# Patient Record
Sex: FEMALE | Race: WHITE | NOT HISPANIC OR LATINO
[De-identification: names, ages, dates, MRNs, and addresses within clinical notes are randomized per-mention and may not be internally consistent; named-entity substitution may affect disease eponyms.]

---

## 2019-03-20 ENCOUNTER — APPOINTMENT (OUTPATIENT)
Dept: ORTHOPEDIC SURGERY | Facility: CLINIC | Age: 59
End: 2019-03-20
Payer: COMMERCIAL

## 2019-03-20 VITALS
HEART RATE: 72 BPM | SYSTOLIC BLOOD PRESSURE: 130 MMHG | WEIGHT: 180 LBS | HEIGHT: 66 IN | DIASTOLIC BLOOD PRESSURE: 75 MMHG | BODY MASS INDEX: 28.93 KG/M2

## 2019-03-20 DIAGNOSIS — Z78.9 OTHER SPECIFIED HEALTH STATUS: ICD-10-CM

## 2019-03-20 DIAGNOSIS — F17.200 NICOTINE DEPENDENCE, UNSPECIFIED, UNCOMPLICATED: ICD-10-CM

## 2019-03-20 DIAGNOSIS — Z82.61 FAMILY HISTORY OF ARTHRITIS: ICD-10-CM

## 2019-03-20 DIAGNOSIS — S93.491A SPRAIN OF OTHER LIGAMENT OF RIGHT ANKLE, INITIAL ENCOUNTER: ICD-10-CM

## 2019-03-20 DIAGNOSIS — Z82.62 FAMILY HISTORY OF OSTEOPOROSIS: ICD-10-CM

## 2019-03-20 PROCEDURE — 73610 X-RAY EXAM OF ANKLE: CPT | Mod: RT

## 2019-03-20 PROCEDURE — 99203 OFFICE O/P NEW LOW 30 MIN: CPT

## 2019-03-20 RX ORDER — METOPROLOL TARTRATE 75 MG/1
TABLET, FILM COATED ORAL
Refills: 0 | Status: ACTIVE | COMMUNITY

## 2019-03-20 RX ORDER — LOSARTAN POTASSIUM AND HYDROCHLOROTHIAZIDE 100; 12.5 MG/1; MG/1
TABLET, FILM COATED ORAL
Refills: 0 | Status: ACTIVE | COMMUNITY

## 2019-03-20 NOTE — HISTORY OF PRESENT ILLNESS
[de-identified] : Patient is here for right ankle pain that began on 3/16/19 when she got up off of a couch and rolled her ankle. She states that she has been having pain and swelling since then. She says that the swelling has decreased since yesterday. She states that she occasionally has pain when she walks but has been able to ambulate. She has been using ice and elevating the area but doing nothing else to treat it at this point. Denies N/T/R/Prior injury.

## 2019-03-20 NOTE — DISCUSSION/SUMMARY
[de-identified] : Discussed findings of today's exam and possible causes of patient's pain.  Educated patient on their most probable diagnosis of right ankle sprain.  Reviewed possible courses of treatment, and we collaboratively decided best course of treatment at this time will include conservative management. Patient advised there is no evidence of fracture on x-ray. She may  over the counter ankle compression sleeve to be worn during the day for support.  Patient will be started on a course of physical therapy to restore normal range of motion and strength as tolerated.  Follow up as needed.  Patient appreciates and agrees with current plan.\par \par This note was generated using dragon medical dictation software.  A reasonable effort has been made for proofreading its contents, but typos may still remain.  If there are any questions or points of clarification needed please notify my office.

## 2019-03-20 NOTE — PHYSICAL EXAM
[de-identified] : Constitutional: Well-nourished, well-developed, No acute distress\par Respiratory:  Good respiratory effort, no SOB\par Lymphatic: No regional lymphadenopathy, no lymphedema\par Psychiatric: Pleasant and normal affect, alert and oriented x3\par Skin: Clean dry and intact B/L UE/LE\par Musculoskeletal: normal except where as noted in regional exam\par \par \par Left Ankle:\par APPEARANCE: no marked deformities, no swelling or malalignment\par POSITIVE TENDERNESS: none\par NONTENDER: medial malleolus, lateral malleolus, tibialis posterior tendon, achilles tendon, no marked thickening of tendon, ATFL, CFL, PTFL, anterior tibiofibular ligament (high ankle), sinus tarsus, deltoid ligaments, 5th metatarsal. \par RANGE OF MOTION: full & painless. \par RESISTIVE TESTING: painless resisted dorsiflexion, plantar flexion, inversion & eversion. \par SPECIAL TESTS: neg anterior drawer. neg talar tilt. neg Kleiger's\par NEURO: Normal sensation of LE, DTRs 2+/4 patella and achilles\par PULSES: 2+ DP/PT pulses\par B/L Hips: No asymmetry, malalignment, or swelling, Full ROM, 5/5 strength in flexion/ext, IR/ER, Abd/Add, Joints stable\par B/L Knees: No asymmetry, malalignment, or swelling, Full ROM, 5/5 strength in Flex/Ext, Joints stable\par \par Right Ankle:\par APPEARANCE: + Moderate swelling, no ecchymosis of the anterolateral ankle and lateral malleolus, no marked deformities  or malalignment\par POSITIVE TENDERNESS: ATFL, CFL, Lateral malleolus\par NONTENDER: medial malleolus, lateral malleolus, tibialis posterior tendon, achilles tendon, no marked thickening of tendon, PTFL, anterior tibiofibular ligament (high ankle), sinus tarsus, deltoid ligaments, 5th metatarsal. \par RANGE OF MOTION: Mild limitation of PF and Inversion due to pain/swelling, NL DF and Eversion. \par RESISTIVE TESTING: Mild pain with resisted eversion and DF.  painless resisted  plantar flexion, and inversion. \par SPECIAL TESTS: + anterior drawer for pain without laxity, + talar tilt for pain without laxity, neg Kleiger's\par PULSES: 2+ DP/PT pulses\par Neuro: NL sensation of ankle, foot and toes, Achilles 2+/4\par \par  [de-identified] : The following radiographs were ordered and read by me during this patient's visit. I reviewed each radiograph in detail with the patient and discussed the findings as highlighted below. \par \par 3 views of the right ankle were obtained today that show no fracture, or dislocation. There are no degenerative changes seen. There is no malalignment. No obvious osseous abnormality. Otherwise unremarkable.\par \par

## 2023-02-26 ENCOUNTER — EMERGENCY (EMERGENCY)
Facility: HOSPITAL | Age: 63
LOS: 1 days | Discharge: HOME | End: 2023-02-26
Attending: EMERGENCY MEDICINE | Admitting: EMERGENCY MEDICINE
Payer: COMMERCIAL

## 2023-02-26 VITALS
OXYGEN SATURATION: 98 % | RESPIRATION RATE: 16 BRPM | TEMPERATURE: 98 F | HEIGHT: 65 IN | DIASTOLIC BLOOD PRESSURE: 78 MMHG | HEART RATE: 64 BPM | WEIGHT: 179.9 LBS | SYSTOLIC BLOOD PRESSURE: 151 MMHG

## 2023-02-26 PROCEDURE — 99283 EMERGENCY DEPT VISIT LOW MDM: CPT

## 2023-02-26 PROCEDURE — 99284 EMERGENCY DEPT VISIT MOD MDM: CPT

## 2023-02-26 RX ORDER — ERYTHROMYCIN BASE 5 MG/GRAM
1 OINTMENT (GRAM) OPHTHALMIC (EYE)
Qty: 5 | Refills: 0
Start: 2023-02-26 | End: 2023-03-04

## 2023-02-26 NOTE — ED PROVIDER NOTE - CLINICAL SUMMARY MEDICAL DECISION MAKING FREE TEXT BOX
62-year-old female presents to the ED complaining of left eyelid redness and pain.  Patient states she had right eye infection that she was told by urgent care was secondary to a stye.  She was given Polytrim eyedrops and that is better.  However the left eye seems to be developing and getting worse.  No drainage of fluid.  Patient just notes that the left upper eyelid is more erythematous and painful than prior.  No fever or chills.  No pain with eye movement.  No visual changes.  Exam as stated. Plan for erythro eye ointment and po abx to treat preseptal cellulitis secondary to stye. Recc warm compresses and f/u with PCP.

## 2023-02-26 NOTE — ED PROVIDER NOTE - OBJECTIVE STATEMENT
62-year-old female presents to the ED complaining of left eyelid redness and pain.  Patient states she had right eye infection that she was told by urgent care was secondary to a stye.  She was given Polytrim eyedrops and that is better.  However the left eye seems to be developing and getting worse.  No drainage of fluid.  Patient just notes that the left upper eyelid is more erythematous and painful than prior.  No fever or chills.  No pain with eye movement.  No visual changes.

## 2023-02-26 NOTE — ED ADULT TRIAGE NOTE - CHIEF COMPLAINT QUOTE
I had conjunctivitis and went to urgent care they said I may have a stye, but it feels like its worse. ,

## 2023-02-26 NOTE — ED PROVIDER NOTE - PHYSICAL EXAMINATION
General:     NAD, well-nourished, well-appearing  Eyes: PERRL, white sclera, Left upper eyelid with erythema.  Stye is noted to the medial aspect of the eyelid.  No crusting or drainage noted.  There is no conjunctival injection.  Range of motion of the eye is normal and without exacerbation of tenderness.  Head:     NC/AT, EOMI

## 2023-02-26 NOTE — ED PROVIDER NOTE - PATIENT PORTAL LINK FT
You can access the FollowMyHealth Patient Portal offered by [facility] by registering at the following website: [email or web address]. By joining [facility]’s FollowMyHealth portal, you will also be able to view your health information using other applications (apps) compatible with our system.

## 2023-02-26 NOTE — ED PROVIDER NOTE - NSFOLLOWUPINSTRUCTIONS_ED_ALL_ED_FT
Preseptal Cellulitis, Adult      Preseptal cellulitis is an infection of the eyelid and the tissues around the eye (periorbital area). The infection causes painful swelling and redness. This condition may also be called periorbital cellulitis.    In most cases, the condition can be treated with antibiotic medicine at home. It is important to treat preseptal cellulitis right away so that it does not get worse. If it gets worse, it can spread to the eye socket and eye muscles (orbital cellulitis). Orbital cellulitis is a medical emergency.      What are the causes?    Preseptal cellulitis is most commonly caused by bacteria. In rare cases, it can be caused by a virus or fungus. The germs that cause preseptal cellulitis may come from:  •A sinus infection that spreads near the eyes.      •An injury near the eye, such as a scratch, puncture wound, animal bite, or insect bite.      •A skin rash, such as eczema or poison ivy, that becomes infected.      •An infected pimple on the eyelid (stye).      •Infection after eyelid surgery or injury.        What increases the risk?    You are more likely to develop this condition if:  •You have a weakened disease-fighting system (immune system).      •You have a medical condition that raises your risk for sinus infections, such as nasal polyps.        What are the signs or symptoms?     Symptoms of this condition include:  •Eyelids that are red and swollen and feel unusually hot.      •Fever.      •Difficulty opening the eye.      •Headache.      •Pain in the face.      Symptoms of this condition usually develop suddenly.      How is this diagnosed?    This condition may be diagnosed based on your symptoms, your medical history, and an eye exam.   How is this treated?    This condition is treated with antibiotic medicines. These may be given by mouth (orally), through an IV, or as an injection. In rare cases, you may need surgery to drain an infected area.      Follow these instructions at home:    Medicines     •Take your antibiotic medicine as told by your health care provider. Do not stop taking the antibiotic even if you start to feel better. We have given an antibiotic ointment and antibiotics to take by mouth.       •Take over-the-counter and prescription medicines only as told by your health care provider.      Eye Care     • Do not use eye drops without first getting approval from your health care provider.      • Do not touch or rub your eye. If you wear contact lenses, do not wear them until your health care provider approves.      •Keep the eye area clean and dry. NO MAKEUP.      •Wash the eye area with a clean washcloth, warm water, and baby shampoo or mild soap.      •To help relieve discomfort, place a clean washcloth that is wet with warm water over your eye. Leave the washcloth on for a few minutes, then remove it.      General instructions     •Wash your hands with soap and water often for at least 20 seconds. If soap and water are not available, use hand .      • Do not use any products that contain nicotine or tobacco, such as cigarettes, e-cigarettes, and chewing tobacco. If you need help quitting, ask your health care provider.      •Drink enough fluid to keep your urine pale yellow.      • Do not drive or operate machinery until your health care provider says that it is safe. Ask your health care provider if it is safe for you to drive.      •Stay up to date on your vaccinations.      •Keep all follow-up visits. This includes any visits with an eye specialist (ophthalmologist) or dentist. This is important.        Get help right away if:    •You have new symptoms.      •Your symptoms get worse or do not get better with treatment.      •You have a fever.      •Your vision becomes blurry or gets worse in any way.      •Your eye looks like it is sticking out or bulging out (proptosis).      •You develop double vision.      •You have trouble moving your eyes or pain when moving your eyes      •You have a severe headache.      •You have neck stiffness or severe neck pain.      These symptoms may represent a serious problem that is an emergency. Do not wait to see if the symptoms will go away. Get medical help right away. Call your local emergency services (911 in the [place] Do not drive yourself to the hospital.       Summary    •Preseptal cellulitis is an infection of the eyelid and the tissues around the eye.      •Symptoms of preseptal cellulitis usually develop suddenly and include red and swollen eyelids, fever, difficulty opening the eye, headache, and facial pain.      •This condition is treated with antibiotic medicines. Do not stop taking the antibiotic even if you start to feel better.      •Preseptal cellulitis can develop into orbital cellulitis, which is a medical emergency. If your condition does not improve or worsens, visit your yaw care provider right away.      This information is not intended to replace advice given to you by your health care provider. Make sure you discuss any questions you have with your health care provider.

## 2023-02-26 NOTE — ED ADULT NURSE NOTE - OBJECTIVE STATEMENT
62 yr old female ambulatory to the ED A&Ox4 presents with left eyelid redness and pain.  Patient states she had right eye infection and was told by urgent care that it was secondary to a stye.  She was given Polytrim eyedrops and that is better.  However the left eye seems to be developing and getting worse.  No drainage of fluid. No fever or chills.  No pain with eye movement.  No visual changes. Full EOM
